# Patient Record
Sex: FEMALE | Race: BLACK OR AFRICAN AMERICAN | NOT HISPANIC OR LATINO | Employment: FULL TIME | URBAN - METROPOLITAN AREA
[De-identification: names, ages, dates, MRNs, and addresses within clinical notes are randomized per-mention and may not be internally consistent; named-entity substitution may affect disease eponyms.]

---

## 2021-10-08 ENCOUNTER — HOSPITAL ENCOUNTER (EMERGENCY)
Facility: CLINIC | Age: 46
Discharge: HOME | End: 2021-10-08
Attending: EMERGENCY MEDICINE | Admitting: EMERGENCY MEDICINE
Payer: COMMERCIAL

## 2021-10-08 ENCOUNTER — APPOINTMENT (OUTPATIENT)
Dept: RADIOLOGY | Facility: CLINIC | Age: 46
End: 2021-10-08
Attending: EMERGENCY MEDICINE
Payer: COMMERCIAL

## 2021-10-08 VITALS
BODY MASS INDEX: 22.81 KG/M2 | SYSTOLIC BLOOD PRESSURE: 115 MMHG | HEART RATE: 70 BPM | RESPIRATION RATE: 16 BRPM | TEMPERATURE: 98.5 F | OXYGEN SATURATION: 100 % | DIASTOLIC BLOOD PRESSURE: 83 MMHG | WEIGHT: 159 LBS

## 2021-10-08 DIAGNOSIS — S92.515A CLOSED NONDISPLACED FRACTURE OF PROXIMAL PHALANX OF LESSER TOE OF LEFT FOOT, INITIAL ENCOUNTER: ICD-10-CM

## 2021-10-08 PROCEDURE — 28510 TREATMENT OF TOE FRACTURE: CPT | Mod: T4

## 2021-10-08 PROCEDURE — 99284 EMERGENCY DEPT VISIT MOD MDM: CPT | Mod: 25

## 2021-10-08 PROCEDURE — 73660 X-RAY EXAM OF TOE(S): CPT | Mod: LT

## 2021-10-08 NOTE — ED PROVIDER NOTES
EMERGENCY DEPARTMENT ENCOUNTER      NAME: Zuly Rausch  AGE: 46 year old female  YOB: 1975  MRN: [id number]  EVALUATION DATE & TIME: No admission date for patient encounter.    PCP: Kerry Fontaine    ED PROVIDER: Tom Forman D.O.      Chief Complaint   Patient presents with     Toe Injury       HPI    Patient information was obtained from: Patient    Use of : N/A       Zuly Rausch is a 46 year old female with no pertinent recorded medical history who presents to the ED via walk-in for evaluation of left foot 5th toe pain.     The patient reports stubbing her 5th toe on her left foot on a box. Notes that she has broken this toe 4 times before. Denies any other symptoms or pain.       REVIEW OF SYSTEMS  Constitutional:  Denies fever, chills, weight loss or weakness  Eyes:  No pain, discharge, redness  HENT:  Denies sore throat, ear pain, congestion  Respiratory: No SOB, wheeze or cough  Cardiovascular:  No CP, palpitations  GI:  Denies abdominal pain, nausea, vomiting, diarrhea  : Denies dysuria, hematuria  Musculoskeletal:  Denies any new joint pain or loss of function.  Positive for foot pain (left foot 5th digit)  Skin:  Denies rash, pallor  Neurologic:  Denies headache, focal weakness or sensory changes  Lymph: Denies swollen nodes  All other systems negative unless noted in HPI.      PAST MEDICAL HISTORY:  No past medical history on file.    PAST SURGICAL HISTORY:  Past Surgical History:   Procedure Laterality Date     CHOLECYSTECTOMY       CURRENT MEDICATIONS:    No current facility-administered medications for this encounter.     Current Outpatient Medications   Medication     hydrOXYzine HCL (ATARAX) 50 MG tablet      ALLERGIES:  Allergies   Allergen Reactions     Penicillins Unknown     FAMILY HISTORY:  No family history on file.    SOCIAL HISTORY:  Social History     Socioeconomic History     Marital status:      Spouse name: Not on file     Number of children: Not on  file     Years of education: Not on file     Highest education level: Not on file   Occupational History     Not on file   Tobacco Use     Smoking status: Never Smoker   Substance and Sexual Activity     Alcohol use: Not on file     Drug use: Not on file     Sexual activity: Not on file   Other Topics Concern     Not on file   Social History Narrative     Not on file     Social Determinants of Health     Financial Resource Strain:      Difficulty of Paying Living Expenses:    Food Insecurity:      Worried About Running Out of Food in the Last Year:      Ran Out of Food in the Last Year:    Transportation Needs:      Lack of Transportation (Medical):      Lack of Transportation (Non-Medical):    Physical Activity:      Days of Exercise per Week:      Minutes of Exercise per Session:    Stress:      Feeling of Stress :    Social Connections:      Frequency of Communication with Friends and Family:      Frequency of Social Gatherings with Friends and Family:      Attends [employer] Services:      Active Member of Clubs or Organizations:      Attends Club or Organization Meetings:      Marital Status:    Intimate Partner Violence:      Fear of Current or Ex-Partner:      Emotionally Abused:      Physically Abused:      Sexually Abused:        VITALS:  Patient Vitals for the past 24 hrs:   BP Temp Temp src Pulse Resp SpO2 Weight   10/08/21 1241 115/83 98.5  F (36.9  C) Oral 70 16 100 % 72.1 kg (159 lb)       PHYSICAL EXAM    VITAL SIGNS: /83   Pulse 70   Temp 98.5  F (36.9  C) (Oral)   Resp 16   Wt 72.1 kg (159 lb)   LMP 09/20/2021 (Approximate)   SpO2 100%   BMI 22.81 kg/m       General: Appears in no acute distress, awake, alert, interactive.  Eyes: Conjunctivae non-injected. Sclera anicteric.  HENT: Atraumatic, normocephalic  Neck: Supple, normal ROM  Respiratory/Chest: Respiration unlabored, no tachypnea  Abdomen: non distended  Musculoskeletal: Normal extremities. No edema or erythema. Tenderness to 5th  digit left foot. No obvious deformities. Neurovascularly intact distally.    Skin: Normal color. No rash or diaphoresis.  Neurologic: Alert and oriented, face symmetric, moves all extremities spontaneously. Speech clear.  Psychiatric:  Affect normal, Judgment normal, Mood normal.         LABS  Results for orders placed or performed during the hospital encounter of 10/08/21 (from the past 24 hour(s))   XR Toe Left G/E 2 Views    Narrative    EXAM: XR TOE LEFT G/E 2 VIEWS  LOCATION: [facility]  DATE/TIME: 10/8/2021 1:00 PM    INDICATION: Left fifth toe injury with history of previous fracture.  COMPARISON: 2016.      Impression    IMPRESSION: Acute nondisplaced refracture left fifth toe proximal phalangeal shaft. Adjacent soft tissue swelling. No joint malalignment. No additional fracture.       RADIOLOGY  XR Toe Left G/E 2 Views   Final Result   IMPRESSION: Acute nondisplaced refracture left fifth toe proximal phalangeal shaft. Adjacent soft tissue swelling. No joint malalignment. No additional fracture.           EKG:    None    PROCEDURES:  None      FINAL IMPRESSION:  1. Closed nondisplaced fracture of proximal phalanx of lesser toe of left foot, initial encounter          ED COURSE & MEDICAL DECISION MAKIN:14 PM I met with the patient to gather history and to perform my initial exam. I discussed the plan for care while in the Emergency Department.  1:20 PM I discussed the plan for discharge with the patient, and patient is agreeable. We discussed supportive cares at home and reasons for return to the ER including new or worsening symptoms - all questions and concerns addressed. Patient to be discharged by RN.     Pertinent Labs & Imaging studies reviewed. (See chart for details)  46 year old female presents to the Emergency Department for evaluation of pain of the pinky toe of the left foot after accidentally kicking a box.  X-ray shows proximal phalanx fracture.  No  additional injuries or complaints.  She was splinted with swathi taping and placed in a hard soled shoe.  Will follow-up as an outpatient with the primary care provider.  Return precautions discussed.    At the conclusion of the encounter I discussed the results of all of the tests and the disposition. The questions were answered. The patient or family acknowledged understanding and was agreeable with the care plan.    PPE: Provider wore gloves, N95 mask, eye protection, surgical cap, and paper mask.         MEDICATIONS GIVEN IN THE EMERGENCY:  Medications - No data to display    NEW PRESCRIPTIONS STARTED AT TODAY'S ER VISIT  New Prescriptions    No medications on file       I, Joellen Moreno, am serving as a scribe to document services personally performed by Dr Dasia DO, based on my observation and the provider's statements to me. I, Dr Dasia DO attest that Joellen Moreno is acting in a scribe capacity, has observed my performance of the services and has documented them in accordance with my direction.       Tom Forman D.O.  Emergency Medicine  [facility] EMERGENCY ROOM  [facility] [facility] [id number] [phone]  Dept: [phone]      Tom Forman DO  10/08/21 9317

## 2022-07-18 ENCOUNTER — HOSPITAL ENCOUNTER (EMERGENCY)
Facility: CLINIC | Age: 47
Discharge: HOME | End: 2022-07-18
Attending: EMERGENCY MEDICINE | Admitting: EMERGENCY MEDICINE
Payer: COMMERCIAL

## 2022-07-18 VITALS
RESPIRATION RATE: 18 BRPM | DIASTOLIC BLOOD PRESSURE: 65 MMHG | TEMPERATURE: 98.8 F | SYSTOLIC BLOOD PRESSURE: 142 MMHG | OXYGEN SATURATION: 95 % | HEART RATE: 88 BPM

## 2022-07-18 DIAGNOSIS — U07.1 INFECTION DUE TO 2019 NOVEL CORONAVIRUS: ICD-10-CM

## 2022-07-18 LAB
DEPRECATED S PYO AG THROAT QL EIA: NEGATIVE
FLUAV RNA SPEC QL NAA+PROBE: NEGATIVE
FLUBV RNA RESP QL NAA+PROBE: NEGATIVE
RSV RNA SPEC NAA+PROBE: NEGATIVE
SARS-COV-2 RNA RESP QL NAA+PROBE: POSITIVE

## 2022-07-18 PROCEDURE — 87637 SARSCOV2&INF A&B&RSV AMP PRB: CPT | Performed by: EMERGENCY MEDICINE

## 2022-07-18 PROCEDURE — C9803 HOPD COVID-19 SPEC COLLECT: HCPCS

## 2022-07-18 PROCEDURE — 99283 EMERGENCY DEPT VISIT LOW MDM: CPT

## 2022-07-18 PROCEDURE — 250N000011 HC RX IP 250 OP 636: Performed by: EMERGENCY MEDICINE

## 2022-07-18 PROCEDURE — 87651 STREP A DNA AMP PROBE: CPT | Performed by: EMERGENCY MEDICINE

## 2022-07-18 RX ADMIN — DEXAMETHASONE 10 MG: 2 TABLET ORAL at 20:11

## 2022-07-18 NOTE — ED TRIAGE NOTES
Pt presents with sore throat, headache and generalized malaise for last few days. Pt reports worsening sore throat today prompting her to come to ED, ABCs intact.         Triage Assessment     Row Name 07/18/22 5474       Triage Assessment (Adult)    Airway WDL WDL       Respiratory WDL    Respiratory WDL WDL       Skin Circulation/Temperature WDL    Skin Circulation/Temperature WDL WDL       Cardiac WDL    Cardiac WDL WDL       Peripheral/Neurovascular WDL    Peripheral Neurovascular WDL WDL       Cognitive/Neuro/Behavioral WDL    Cognitive/Neuro/Behavioral WDL WDL

## 2022-07-19 LAB — GROUP A STREP BY PCR: NOT DETECTED

## 2022-07-19 NOTE — DISCHARGE INSTRUCTIONS
"Discharge Instructions for COVID-19 Patients  You have--or may have--COVID-19. Please follow the instructions listed below.   If you have a weakened immune system, discuss with your doctor any other actions you need to take.  How can I protect others?  If you have symptoms (fever, cough, body aches or trouble breathing):  Stay home and away from others (self-isolate) until:  Your other symptoms have resolved (gotten better). And   You've had no fever--and no medicine that reduces fever--for 1 full day (24 hours). And   At least 10 days have passed since your symptoms started. (You may need to wait 20 days. Follow the advice of your care team.)  If you don't show symptoms, but testing showed that you have COVID-19:  Stay home and away from others (self-isolate) until at least 10 days have passed since the date of your first positive COVID-19 test.  During this time  Stay in your own room, even for meals. Use your own bathroom if you can.  Stay away from others in your home. No hugging, kissing or shaking hands. No visitors.  Don't go to work, school or anywhere else.  Clean \"high touch\" surfaces often (doorknobs, counters, handles). Use household cleaning spray or wipes.  You'll find a full list of  on the EPA website: www.epa.gov/pesticide-registration/list-n-disinfectants-use-against-sars-cov-2.  Cover your mouth and nose with a mask or other face covering to avoid spreading germs.  Wash your hands and face often. Use soap and water.  Caregivers in these groups are at risk for severe illness due to COVID-19:  People 65 years and older  People who live in a nursing home or long-term care facility  People with chronic disease (lung, heart, cancer, diabetes, kidney, liver, immunologic)  People who have a weakened immune system, including those who:  Are in cancer treatment  Take medicine that weakens the immune system, such as corticosteroids  Had a bone marrow or organ transplant  Have an immune " deficiency  Have poorly controlled HIV or AIDS  Are obese (body mass index of 40 or higher)  Smoke regularly  Caregivers should wear gloves while washing dishes, handling laundry and cleaning bedrooms and bathrooms.  Use caution when washing and drying laundry: Don't shake dirty laundry and use the warmest water setting that you can.  For more tips on managing your health at home, go to www.cdc.gov/coronavirus/2019-ncov/downloads/10Things.pdf.  How can I take care of myself at home?  Get lots of rest. Drink extra fluids (unless a doctor has told you not to).  Take Tylenol (acetaminophen) for fever or pain. If you have liver or kidney problems, ask your family doctor if it's okay to take Tylenol.   Adults can take either:   650 mg (two 325 mg pills) every 4 to 6 hours, or   1,000 mg (two 500 mg pills) every 8 hours as needed.  Note: Don't take more than 3,000 mg in one day. Acetaminophen is found in many medicines (both prescribed and over-the-counter medicines). Read all labels to be sure you don't take too much.   For children, check the Tylenol bottle for the right dose. The dose is based on the child's age or weight.  If you have other health problems (like cancer, heart failure, an organ transplant or severe kidney disease): Call your specialty clinic if you don't feel better in the next 2 days.  Know when to call 911. Emergency warning signs include:  Trouble breathing or shortness of breath  Pain or pressure in the chest that doesn't go away  Feeling confused like you haven't felt before, or not being able to wake up  Bluish-colored lips or face  Your doctor may have prescribed a blood thinner medicine. Follow their instructions.  Where can I get more information?   [employer] [facility] - About COVID-19:   [email or web address]  CDC - What to Do If You're Sick: www.cdc.gov/coronavirus/2019-ncov/about/steps-when-sick.html  CDC - Ending Home Isolation:  www.cdc.gov/coronavirus/2019-ncov/hcp/disposition-in-home-patients.html  CDC - Caring for Someone: www.cdc.gov/coronavirus/2019-ncov/if-you-are-sick/care-for-someone.html  [facility] - Interim Guidance for Hospital Discharge to Home: www.health.[facility].[place].us/diseases/coronavirus/hcp/hospdischarge.pdf  Below are the COVID-19 hotlines at the [place] Department of Health ([facility]). Interpreters are available.  For health questions: Call 831-699-9131 or 1-395.443.1505 (7 a.m. to 7 p.m.)  For questions about schools and childcare: Call 822-844-8896 or 1-218.651.5472 (7 a.m. to 7 p.m.)    For informational purposes only. Not to replace the advice of your health care provider. Clinically reviewed by Dr. Kevin Thakur.   Copyright   2020 [facility]. All rights reserved. [email or web address] [id number] - REV 01/05/21.

## 2022-07-19 NOTE — ED PROVIDER NOTES
EMERGENCY DEPARTMENT ENCOUNTER      NAME: Zuly Rausch  AGE: 47 year old female  YOB: 1975  MRN: [id number]  EVALUATION DATE & TIME: No admission date for patient encounter.    PCP: Kerry Fontaine    ED PROVIDER: Tom Forman D.O.      Chief Complaint   Patient presents with     Pharyngitis       FINAL IMPRESSION:  1. Infection due to 2019 novel coronavirus        ED COURSE & MEDICAL DECISION MAKIN:54 PM I met with the patient to gather history and to perform my initial exam. I discussed the plan for care while in the Emergency Department.         Pertinent Labs & Imaging studies reviewed. (See chart for details)  47 year old female presents to the Emergency Department for evaluation of symptoms concerning for COVID-19.  COVID test did come back positive this evening.  There is no clinical concern for PTA, RPA, or strep pharyngitis.  Due to her sore throat I did treat with Decadron for symptomatic relief.  She will follow-up as an outpatient with her primary care provider.  Return precautions were discussed.    At the conclusion of the encounter I discussed the results of all of the tests and the disposition. The questions were answered. The patient or family acknowledged understanding and was agreeable with the care plan.      HPI    Patient information was obtained from: the patient      Zuly Rausch is a 47 year old female who presents to the emergency department with complaint of body aches, subjective fever, sore throat, cough, and mild headache for the past 3 days.  Patient is vaccinated for COVID-19.  She denies chest pain, shortness of breath, nausea, vomiting, diarrhea, dysuria, hematuria, numbness, tingling, additional complaints at this time.      REVIEW OF SYSTEMS  Constitutional:  Denies chills, weight loss or weakness, reports subjective fevers  Eyes:  No pain, discharge, redness  HENT:  Denies ear pain, congestion, reports sore throat  Respiratory: No SOB, wheeze, reports  cough  Cardiovascular:  No CP, palpitations  GI:  Denies abdominal pain, nausea, vomiting, diarrhea  : Denies dysuria, hematuria  Musculoskeletal:  Denies any new swelling or loss of function, reports body aches  Skin:  Denies rash, pallor  Neurologic:  Denies headache, focal weakness or sensory changes  Lymph: Denies swollen nodes    All other systems negative unless noted in HPI.    PAST MEDICAL HISTORY:  No past medical history on file.    PAST SURGICAL HISTORY:  Past Surgical History:   Procedure Laterality Date     CHOLECYSTECTOMY           CURRENT MEDICATIONS:    Current Facility-Administered Medications   Medication     dexamethasone (DECADRON) tablet 10 mg     Current Outpatient Medications   Medication     hydrOXYzine HCL (ATARAX) 50 MG tablet         ALLERGIES:  Allergies   Allergen Reactions     Penicillins Unknown       FAMILY HISTORY:  No family history on file.    SOCIAL HISTORY:  Social History     Socioeconomic History     Marital status:    Tobacco Use     Smoking status: Never Smoker       VITALS:  Patient Vitals for the past 24 hrs:   BP Temp Temp src Pulse Resp SpO2   07/18/22 1842 109/84 99.4  F (37.4  C) Oral 102 16 98 %       PHYSICAL EXAM    VITAL SIGNS: /84   Pulse 102   Temp 99.4  F (37.4  C) (Oral)   Resp 16   LMP 07/18/2022   SpO2 98%     General Appearance: Well-appearing, well-nourished, no acute distress  Head:  Normocephalic, without obvious abnormality, atraumatic  Eyes:  PERRL, conjunctiva/corneas clear, EOM's intact,  ENT:  Lips, mucosa, and tongue normal, membranes are moist without pallor, mild pharyngeal erythema and mild bilateral swelling without exudates  Neck:  Normal ROM, symmetrical, trachea midline    Cardio:  Regular rate and rhythm, no murmur, rub or gallop, 2+ pulses symmetric in all extremities  Pulm:  Clear to auscultation bilaterally, respirations unlabored,  Musculoskeletal: Full ROM, no edema, no cyanosis, good ROM of major joints  Integument:   Warm, Dry, No erythema, No rash.    Neurologic:  Alert & oriented.  No focal deficits appreciated.  Ambulatory.  Psychiatric:  Affect normal, Judgment normal, Mood normal.      LABS  Results for orders placed or performed during the hospital encounter of 07/18/22 (from the past 24 hour(s))   Streptococcus A Rapid Screen w/Reflex to PCR    Specimen: Throat; Swab   Result Value Ref Range    Group A Strep antigen Negative Negative   Symptomatic; Yes; 7/15/2022 Influenza A/B & SARS-CoV2 (COVID-19) Virus PCR Multiplex Nasopharyngeal    Specimen: Nasopharyngeal; Swab   Result Value Ref Range    Influenza A PCR Negative Negative    Influenza B PCR Negative Negative    RSV PCR Negative Negative    SARS CoV2 PCR Positive (A) Negative    Narrative    Testing was performed using the Xpert Xpress CoV2/Flu/RSV Assay on the [employer] Instrument. This test should be ordered for the detection of SARS-CoV-2 and influenza viruses in individuals who meet clinical and/or epidemiological criteria. Test performance is unknown in asymptomatic patients. This test is for in vitro diagnostic use under the FDA EUA for laboratories certified under CLIA to perform high or moderate complexity testing. This test has not been FDA cleared or approved. A negative result does not rule out the presence of PCR inhibitors in the specimen or target RNA in concentration below the limit of detection for the assay. If only one viral target is positive but coinfection with multiple targets is suspected, the sample should be re-tested with another FDA cleared, approved, or authorized test, if coinfection would change clinical management. This test was validated by the [facility] [employer]. These laboratories are certified under the Clinical  Laboratory Improvement Amendments of 1988 (CLIA-88) as qualified to perform high complexity laboratory testing.         RADIOLOGY  No orders to display        MEDICATIONS GIVEN IN THE  EMERGENCY:  Medications   dexamethasone (DECADRON) tablet 10 mg (has no administration in time range)       NEW PRESCRIPTIONS STARTED AT TODAY'S ER VISIT  New Prescriptions    No medications on file        Tom Forman D.O.  Emergency Medicine  [facility] EMERGENCY ROOM  [facility] [facility] [id number] [phone]  Dept: [phone]       Tom Forman DO  07/18/22 1957

## 2023-06-20 ENCOUNTER — HOSPITAL ENCOUNTER (EMERGENCY)
Facility: CLINIC | Age: 48
Discharge: HOME | End: 2023-06-20
Attending: EMERGENCY MEDICINE | Admitting: EMERGENCY MEDICINE
Payer: OTHER MISCELLANEOUS

## 2023-06-20 ENCOUNTER — APPOINTMENT (OUTPATIENT)
Dept: RADIOLOGY | Facility: CLINIC | Age: 48
End: 2023-06-20
Attending: EMERGENCY MEDICINE
Payer: OTHER MISCELLANEOUS

## 2023-06-20 VITALS
BODY MASS INDEX: 21.9 KG/M2 | OXYGEN SATURATION: 97 % | DIASTOLIC BLOOD PRESSURE: 86 MMHG | HEART RATE: 73 BPM | HEIGHT: 70 IN | WEIGHT: 153 LBS | RESPIRATION RATE: 18 BRPM | SYSTOLIC BLOOD PRESSURE: 119 MMHG | TEMPERATURE: 98.1 F

## 2023-06-20 DIAGNOSIS — S67.10XA CRUSH INJURY TO FINGER, INITIAL ENCOUNTER: ICD-10-CM

## 2023-06-20 DIAGNOSIS — S62.639A CLOSED FRACTURE OF TUFT OF DISTAL PHALANX OF FINGER: ICD-10-CM

## 2023-06-20 PROCEDURE — 99284 EMERGENCY DEPT VISIT MOD MDM: CPT | Mod: 25

## 2023-06-20 PROCEDURE — 26750 TREAT FINGER FRACTURE EACH: CPT | Mod: F2

## 2023-06-20 PROCEDURE — 73140 X-RAY EXAM OF FINGER(S): CPT | Mod: LT

## 2023-06-20 NOTE — ED TRIAGE NOTES
Pt reports that yesterday, (Sunday night) she was working in the [employer] and had a heavy object land on her finger. She was given ibuprofen which helped but tonight she reports having increased pain especially with movement.  Finger is ecchymotic at the tip     Triage Assessment     Row Name 06/20/23 0133       Triage Assessment (Adult)    Airway WDL WDL       Respiratory WDL    Respiratory WDL WDL       Skin Circulation/Temperature WDL    Skin Circulation/Temperature WDL WDL       Cardiac WDL    Cardiac WDL WDL       Peripheral/Neurovascular WDL    Peripheral Neurovascular WDL X;neurovascular assessment upper       LUE Neurovascular Assessment    Temperature LUE warm    Color LUE other (see comments)  purple    Sensation LUE no numbness;tenderness present;no tingling       Cognitive/Neuro/Behavioral WDL    Cognitive/Neuro/Behavioral WDL WDL

## 2023-06-20 NOTE — ED PROVIDER NOTES
EMERGENCY DEPARTMENT ENCOUnter      NAME: Zuly Rausch  AGE: 48 year old female  YOB: 1975  MRN: [id number]  EVALUATION DATE & TIME: No admission date for patient encounter.    PCP: Kerry Fontaine    ED PROVIDER: Geeta Brady MD      Chief Complaint   Patient presents with     Finger     Left middle finger     Hand Pain         FINAL IMPRESSION:  1. Closed fracture of tuft of distal phalanx of finger    2. Crush injury to finger, initial encounter          ED COURSE & MEDICAL DECISION MAKING:      In summary, the patient is a 48-year-old female that presents to the emergency department for evaluation of an injury of her left middle finger that caused a tuft fracture of her distal phalanx.  We will treat with a finger splint and close outpatient follow-up.    Medical Decision Making    History:    Supplemental history from: Documented in chart, if applicable    External Record(s) reviewed: Documented in chart, if applicable.    Work Up:    Chart documentation includes differential considered and any EKGs or imaging independently interpreted by provider, where specified.    In additional to work up documented, I considered the following work up: Documented in chart, if applicable.    External consultation:    Discussion of management with another provider: Documented in chart, if applicable    Complicating factors:    Care impacted by chronic illness: N/A    Care affected by social determinants of health: N/A    Disposition considerations: Discharge. No recommendations on prescription strength medication(s). See documentation for any additional details.      2:31 AM I met with patient for initial encounter and updated with plan for discharge.  Finger splint applied to patient's left long finger  At the conclusion of the encounter I discussed the results of all of the tests and the disposition. The questions were answered. The patient or family acknowledged understanding and was agreeable with  "the care plan.         MEDICATIONS GIVEN IN THE EMERGENCY:  Medications - No data to display    NEW PRESCRIPTIONS STARTED AT TODAY'S ER VISIT  Discharge Medication List as of 6/20/2023  2:40 AM             =================================================================    HPI        Zuly Rausch is a 48 year old female with history reviewed and nothing pertinent who presents to this ED via walk-in for evaluation of left middle finger pain.    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 She currently endorses left middle finger pain only when she moves the finger, and also states that the finger is \"changing color.\" Patient is right handed.    Patient denies all other complaints at this time.      REVIEW OF SYSTEMS     Constitutional:  Denies fever or chills  HENT:  Denies sore throat   Respiratory:  Denies cough or shortness of breath   Cardiovascular:  Denies chest pain or palpitations  GI:  Denies abdominal pain, nausea, or vomiting  Musculoskeletal:  Positive for left middle finger pain   Skin:  Denies rash   Neurologic:  Denies headache, focal weakness or sensory changes    All other systems reviewed and are negative      PAST MEDICAL HISTORY:  History reviewed. No pertinent past medical history.    PAST SURGICAL HISTORY:  Past Surgical History:   Procedure Laterality Date     CHOLECYSTECTOMY             CURRENT MEDICATIONS:    hydrOXYzine HCL (ATARAX) 50 MG tablet        ALLERGIES:  Allergies   Allergen Reactions     Penicillins Unknown       FAMILY HISTORY:  History reviewed. No pertinent family history.    SOCIAL HISTORY:   Social History     Socioeconomic History     Marital status:      Spouse name: None     Number of children: None     Years of education: None     Highest education level: None   Tobacco Use     Smoking status: Never         PHYSICAL EXAM    Constitutional:  Well developed, Well " nourished,  HENT:  Normocephalic, Atraumatic, Bilateral external ears normal, Oropharynx moist, Nose normal.   Neck:  Normal range of motion, No meningismus, No stridor.   Eyes:  EOMI, Conjunctiva normal, No discharge.   Respiratory:  Normal breath sounds, No respiratory distress, No wheezing, No chest tenderness.   Cardiovascular:  Normal heart rate, Normal rhythm, No murmurs  GI:  Soft, No tenderness, No guarding,   Musculoskeletal:  Neurovascularly intact distally, minimal edema distal left long finger,  tenderness distal left long finger, No cyanosis, Good range of motion in all major joints.  No nail injury appreciated on distal left long finger  Integument:  Warm, Dry, No erythema, No rash.  Ecchymosis appreciated distal left long finger  Lymphatic:  No lymphadenopathy noted.   Neurologic:  Alert & oriented x 3, Normal motor function, Normal sensory function, No focal deficits noted.   Psychiatric:  Affect normal, Judgment normal, Mood normal.      LAB:  All pertinent labs reviewed and interpreted.  Results for orders placed or performed during the hospital encounter of 06/20/23   Fingers XR, 2-3 views, left    Impression    IMPRESSION: 3 views of the left middle finger. Nondisplaced fracture of the tuft of the distal phalanx.         RADIOLOGY:  I have independently reviewed and interpreted the above imaging, pending the final radiology read.  Fingers XR, 2-3 views, left   Final Result   IMPRESSION: 3 views of the left middle finger. Nondisplaced fracture of the tuft of the distal phalanx.                   I, Carlos Sharma, am serving as a scribe to document services personally performed by Dr. Brady based on my observation and the provider's statements to me. I, Geeta Brady MD attest that Carlos Sharma is acting in a scribe capacity, has observed my performance of the services and has documented them in accordance with my direction.    Geeta Brady MD  Emergency Medicine  [facility] EMERGENCY ROOM  [address] [address]  [phone]  Dept: [phone]      Geeta Brady MD  06/21/23 0421

## 2023-06-20 NOTE — DISCHARGE INSTRUCTIONS
Use the finger splint for 3 to 4 weeks to help protect your finger, especially at work.  You can remove the splint while you are bathing.  Rest, ice and elevate over the next couple days.  Tylenol 650 mg every 4 hours as needed for pain  Ibuprofen 600 mg every 6 hours as needed for pain

## 2023-06-20 NOTE — Clinical Note
Zuly Rausch was seen and treated in our emergency department on 6/20/2023.  She may return to work on 06/20/2023.  Patient must use finger splint while at work due to broken finger bone for 3 weeks     If you have any questions or concerns, please don't hesitate to call.      Geeta Brady MD

## 2024-09-13 ENCOUNTER — HOSPITAL ENCOUNTER (EMERGENCY)
Facility: CLINIC | Age: 49
Discharge: HOME | End: 2024-09-13
Admitting: EMERGENCY MEDICINE
Payer: COMMERCIAL

## 2024-09-13 VITALS
SYSTOLIC BLOOD PRESSURE: 133 MMHG | TEMPERATURE: 99.1 F | RESPIRATION RATE: 18 BRPM | BODY MASS INDEX: 21.95 KG/M2 | HEART RATE: 104 BPM | OXYGEN SATURATION: 97 % | WEIGHT: 153 LBS | DIASTOLIC BLOOD PRESSURE: 94 MMHG

## 2024-09-13 DIAGNOSIS — M79.622 PAIN IN LEFT AXILLA: ICD-10-CM

## 2024-09-13 PROCEDURE — 99282 EMERGENCY DEPT VISIT SF MDM: CPT

## 2024-09-13 ASSESSMENT — COLUMBIA-SUICIDE SEVERITY RATING SCALE - C-SSRS
1. IN THE PAST MONTH, HAVE YOU WISHED YOU WERE DEAD OR WISHED YOU COULD GO TO SLEEP AND NOT WAKE UP?: NO
2. HAVE YOU ACTUALLY HAD ANY THOUGHTS OF KILLING YOURSELF IN THE PAST MONTH?: NO
6. HAVE YOU EVER DONE ANYTHING, STARTED TO DO ANYTHING, OR PREPARED TO DO ANYTHING TO END YOUR LIFE?: NO

## 2024-09-13 NOTE — ED TRIAGE NOTES
Pt presents to the ED with c/o left side pain/discomfort near armpit area that started 3 days ago. Pt denies CP. States that it is very uncomfortable. Denies noticing rash or other abnormalities.      Triage Assessment (Adult)       Row Name 09/13/24 1434          Triage Assessment    Airway WDL WDL        Respiratory WDL    Respiratory WDL WDL        Skin Circulation/Temperature WDL    Skin Circulation/Temperature WDL WDL        Cardiac WDL    Cardiac WDL WDL        Peripheral/Neurovascular WDL    Peripheral Neurovascular WDL WDL        Cognitive/Neuro/Behavioral WDL    Cognitive/Neuro/Behavioral WDL WDL

## 2024-09-13 NOTE — ED PROVIDER NOTES
Emergency Department Encounter   NAME: Zuly Rausch  AGE: 49 year old female  YOB: 1975  MRN: [id number]    PCP: [facility]  ED PROVIDER: Tammy Carrasco PA-C    Evaluation Date & Time:   9/13/2024  3:05 PM    CHIEF COMPLAINT:  Side pain      Impression and Plan   MDM: 49-year-old female with no pertinent history presents for evaluation of left axilla pain.  Noticed 3 days ago.  Has been getting better.  No rash or bump noted.  No nipple discharge or breast pain.  On arrival here patient is slightly hypertensive at 133/94 and slightly tachycardic at 104, but otherwise vitally stable.  Afebrile.  On my examination patient is in no acute distress.  She is no longer tachycardic on my exam after resting.  There is no tenderness to palpation of the left axilla.  I am unable to palpate any mass or fluctuance.  There is no overlying rash.  No evidence of cellulitis.  No open wounds.  Not consistent with abscess.  There is no arm swelling noted be concerning for DVT.  Patient has no history of or risk factors for clots. She has no chest pain, shortness of breath, or pleuritic pain - I have very low suspicion for atypical ACS or PE causing her pain.     Pain has been getting better and she is actually in no pain on examination today.  I did not see any emergent cause of what would be causing her pain.  Did not believe that any labs or imaging is indicated today.  It is possible that she just strained this area when she was working around the house.  We discussed that if the pain returns she should use Tylenol and ibuprofen.  She will see her primary care provider if the pain returns.  We reviewed strict return precautions and patient was discharged home in stable condition.         Medical Decision Making  Obtained supplemental history:Supplemental history obtained?: No  Reviewed external records: External records reviewed?: No  Care impacted by chronic illness:N/A  Care significantly  affected by social determinants of health:N/A  Did you consider but not order tests?: Work up considered but not performed and documented in chart, if applicable  Did you interpret images independently?: Independent interpretation of ECG and images noted in documentation, when applicable.  Consultation discussion with other provider:Did you involve another provider (consultant, , pharmacy, etc.)?: No  Discharge. No recommendations on prescription strength medication(s). N/A.   At the conclusion of the encounter I discussed the results of all the tests and the disposition. The questions were answered. The patient or family acknowledged understanding and was agreeable with the care plan.    Not Applicable       FINAL IMPRESSION:    ICD-10-CM    1. Pain in left axilla  M79.622             MEDICATIONS GIVEN IN THE EMERGENCY DEPARTMENT:  Medications - No data to display      NEW PRESCRIPTIONS STARTED AT TODAY'S ED VISIT:  Discharge Medication List as of 9/13/2024  3:05 PM            HPI   Patient information was obtained from: patient   Use of Intrepreter: N/A     Zuly Rausch is a 49 year old female with no pertinent history who presents to the ED by car for evaluation of left axilla pain.  3 days ago patient was working around the house when she noticed a pain in her left armpit.  She touched this area multiple times which made the pain worse.  This been intermittent but the pain has been improving since then.  She denies any rash or bumps.  She has not strength armpit.  No fevers or chills.  No arm swelling.  No chest pain or shortness of breath. No breast soreness or nipple discharge.       REVIEW OF SYSTEMS:  Pertinent positive and negative symptoms per HPI.       Medical History     No past medical history on file.    Past Surgical History:   Procedure Laterality Date    CHOLECYSTECTOMY         No family history on file.    Social History     Tobacco Use    Smoking status: Never       hydrOXYzine HCL (ATARAX) 50  MG tablet          Physical Exam     First Vitals:  Patient Vitals for the past 24 hrs:   BP Temp Temp src Pulse Resp SpO2 Weight   09/13/24 1430 (!) 133/94 99.1  F (37.3  C) Temporal 104 18 97 % 69.4 kg (153 lb)       PHYSICAL EXAM:   General Appearance:  Alert, cooperative, no distress, appears stated age  HENT: Normocephalic without obvious deformity, atraumatic. Mucous membranes moist   Eyes: Conjunctiva clear, Lids normal. No discharge.   Respiratory: No distress. Lungs clear to ausculation bilaterally. No wheezes, rhonchi or stridor  Cardiovascular: Regular rate and rhythm, no murmur. Normal cap refill. No peripheral edema  Musculoskeletal: Moving all extremities. No gross deformities.  Full range of motion of the left shoulder.  Integument: Warm, dry, no rashes or lesions.   Left axilla: No rash.  No palpable mass.  No overlying erythema or excessive warmth.  No tenderness to.  Neurologic: Alert and orientated x3.   Psych: Normal mood and affect      Results     LAB:  All pertinent labs reviewed and interpreted  Labs Ordered and Resulted from Time of ED Arrival to Time of ED Departure - No data to display    RADIOLOGY:  No orders to display         Tammy Carrasco PA-C   Emergency Medicine   [facility] EMERGENCY ROOM       Tammy Carrasco PA-C  09/13/24 1085

## 2024-09-13 NOTE — DISCHARGE INSTRUCTIONS
You were seen in the emergency department for evaluation of pain in your left armpit.  Thankfully, there is no sign of infection.  I am not able to feel any mass or bump.  I suspect you may have strained something that was causing your pain.  Thankfully, your pain seems to be getting better.  If it returns and is not responding to Tylenol and ibuprofen please see your primary care provider for further evaluation.    Return to the emergency room for fever, chills, arm swelling, uncontrollable pain, or any other concerning symptoms.